# Patient Record
Sex: MALE | Race: WHITE
[De-identification: names, ages, dates, MRNs, and addresses within clinical notes are randomized per-mention and may not be internally consistent; named-entity substitution may affect disease eponyms.]

---

## 2021-04-28 PROBLEM — Z00.00 ENCOUNTER FOR PREVENTIVE HEALTH EXAMINATION: Status: ACTIVE | Noted: 2021-04-28

## 2021-04-29 ENCOUNTER — APPOINTMENT (OUTPATIENT)
Dept: ENDOCRINOLOGY | Facility: CLINIC | Age: 76
End: 2021-04-29
Payer: MEDICARE

## 2021-04-29 ENCOUNTER — RESULT CHARGE (OUTPATIENT)
Age: 76
End: 2021-04-29

## 2021-04-29 VITALS
SYSTOLIC BLOOD PRESSURE: 132 MMHG | WEIGHT: 146 LBS | BODY MASS INDEX: 24.32 KG/M2 | DIASTOLIC BLOOD PRESSURE: 80 MMHG | OXYGEN SATURATION: 99 % | HEIGHT: 65 IN | HEART RATE: 80 BPM

## 2021-04-29 LAB — GLUCOSE BLDC GLUCOMTR-MCNC: 134

## 2021-04-29 PROCEDURE — 82962 GLUCOSE BLOOD TEST: CPT

## 2021-04-29 PROCEDURE — 99072 ADDL SUPL MATRL&STAF TM PHE: CPT

## 2021-04-29 PROCEDURE — 99204 OFFICE O/P NEW MOD 45 MIN: CPT

## 2021-04-29 RX ORDER — LISINOPRIL 40 MG/1
40 TABLET ORAL
Refills: 0 | Status: ACTIVE | COMMUNITY

## 2021-04-29 RX ORDER — PRAVASTATIN SODIUM 80 MG/1
80 TABLET ORAL
Refills: 0 | Status: ACTIVE | COMMUNITY

## 2021-04-29 NOTE — ASSESSMENT
[FreeTextEntry1] : DM type 2, stable control\par prior h/o hypoparathyroidism; may need to improve, may need calcitriol. WIll search for recent bw

## 2021-04-29 NOTE — PHYSICAL EXAM
[Alert] : alert [No Acute Distress] : no acute distress [Normal Sclera/Conjunctiva] : normal sclera/conjunctiva [Thyroid Not Enlarged] : the thyroid was not enlarged [Clear to Auscultation] : lungs were clear to auscultation bilaterally [No Stigmata of Cushings Syndrome] : no stigmata of Cushings Syndrome [Cranial Nerves Intact] : cranial nerves 2-12 were intact [Oriented x3] : oriented to person, place, and time [Normal Insight/Judgement] : insight and judgment were intact [de-identified] : signs of overall weight loss [de-identified] : irregular rhythm [de-identified] : warm and dry

## 2021-04-29 NOTE — HISTORY OF PRESENT ILLNESS
[FreeTextEntry1] : DM type:2\par Severity:moderate\par Duration:33 years\par COmplications:nephropathy, neuropathy\par \par Modifying Factors:using rich 2\par \par Current regimen:\par glipizide 5\par metformin 500 2/day\par januvia 100 1/2 daily\par \par did not tolerate avandia and actos in the past due to dyspnea\par \par PMH:\par pacemaker\par idiopathic hypoparathyroidism, on calcitriol in the past\par s/p right knee replacement\par anemia, GI workup negative\par renal insufficiency, followed by Dr. Allen\par \par \par Current control:\par stable\par \par intermittent muscle spasms. Weight loss [Silvia] : Silvia [FreeTextEntry2] : 83 [FreeTextEntry3] : 15 [FreeTextEntry4] : 2

## 2021-07-28 ENCOUNTER — APPOINTMENT (OUTPATIENT)
Dept: ENDOCRINOLOGY | Facility: CLINIC | Age: 76
End: 2021-07-28
Payer: MEDICARE

## 2021-07-28 VITALS
HEART RATE: 74 BPM | HEIGHT: 64 IN | DIASTOLIC BLOOD PRESSURE: 80 MMHG | BODY MASS INDEX: 25.1 KG/M2 | WEIGHT: 147 LBS | SYSTOLIC BLOOD PRESSURE: 134 MMHG

## 2021-07-28 LAB — GLUCOSE BLDC GLUCOMTR-MCNC: 145

## 2021-07-28 PROCEDURE — 99214 OFFICE O/P EST MOD 30 MIN: CPT | Mod: 25

## 2021-07-28 PROCEDURE — 95251 CONT GLUC MNTR ANALYSIS I&R: CPT

## 2021-07-28 PROCEDURE — 99072 ADDL SUPL MATRL&STAF TM PHE: CPT

## 2021-07-28 PROCEDURE — 82962 GLUCOSE BLOOD TEST: CPT

## 2021-07-28 RX ORDER — GLIMEPIRIDE 4 MG/1
4 TABLET ORAL
Refills: 0 | Status: ACTIVE | COMMUNITY

## 2021-07-28 NOTE — HISTORY OF PRESENT ILLNESS
[Silvia] : Silvia [FreeTextEntry1] : DM type:2\par Severity:moderate\par Duration:33 years\par COmplications:nephropathy, neuropathy\par \par Modifying Factors:using rich 2\par \par Current regimen:\par glimepiride 4\par metformin 500 2/day\par januvia 100 1/2 daily\par \par did not tolerate avandia and actos in the past due to dyspnea\par \par PMH:\par pacemaker\par idiopathic hypoparathyroidism, on calcitriol in the past. \par s/p right knee replacement\par anemia, GI workup negative\par renal insufficiency, followed by Dr. Allen\par \par occasional cramps and paresthesias\par \par \par Current control:\par stable\par \par intermittent muscle spasms. Weight loss [FreeTextEntry2] : 86 [FreeTextEntry3] : 13 [FreeTextEntry4] : 1 [de-identified] : 6.6 [FreeTextEntry5] : 138 [FreeTextEntry6] : 29.5

## 2021-07-28 NOTE — ASSESSMENT
[FreeTextEntry1] : DM type 2, stable control\par hypocalcemia, worsening. Needs either more calcium or more calcitriol; will verify outpatient rx and then decide on change

## 2021-08-12 RX ORDER — BLOOD SUGAR DIAGNOSTIC
STRIP MISCELLANEOUS 3 TIMES DAILY
Qty: 300 | Refills: 0 | Status: ACTIVE | COMMUNITY
Start: 2021-08-12 | End: 1900-01-01

## 2021-10-20 LAB
HBA1C MFR BLD HPLC: 7.6
LDLC SERPL CALC-MCNC: 67

## 2021-10-21 ENCOUNTER — APPOINTMENT (OUTPATIENT)
Dept: ENDOCRINOLOGY | Facility: CLINIC | Age: 76
End: 2021-10-21

## 2021-11-01 LAB
HBA1C MFR BLD HPLC: 7.5
LDLC SERPL DIRECT ASSAY-MCNC: 75
MICROALBUMIN/CREAT 24H UR-RTO: 143

## 2021-11-03 ENCOUNTER — APPOINTMENT (OUTPATIENT)
Dept: ENDOCRINOLOGY | Facility: CLINIC | Age: 76
End: 2021-11-03
Payer: MEDICARE

## 2021-11-03 VITALS
BODY MASS INDEX: 25.1 KG/M2 | SYSTOLIC BLOOD PRESSURE: 130 MMHG | HEIGHT: 64 IN | DIASTOLIC BLOOD PRESSURE: 80 MMHG | HEART RATE: 66 BPM | WEIGHT: 147 LBS

## 2021-11-03 LAB
GLUCOSE BLDC GLUCOMTR-MCNC: 206
PODIATRY EVAL: NORMAL

## 2021-11-03 PROCEDURE — 99214 OFFICE O/P EST MOD 30 MIN: CPT | Mod: 25

## 2021-11-03 PROCEDURE — 95251 CONT GLUC MNTR ANALYSIS I&R: CPT

## 2021-11-03 PROCEDURE — 82962 GLUCOSE BLOOD TEST: CPT

## 2021-11-03 NOTE — HISTORY OF PRESENT ILLNESS
[Silvia] : Silvia [FreeTextEntry1] : DM type:2\par Severity:moderate\par Duration:33 years\par COmplications:nephropathy, neuropathy\par \par Modifying Factors:using rich 2\par \par Current regimen:\par glimepiride 4\par metformin 500 2/day\par januvia 100 1/2 daily\par \par did not tolerate avandia and actos in the past due to dyspnea\par \par PMH:\par pacemaker\par idiopathic hypoparathyroidism, on calcitriol in the past. \par s/p right knee replacement\par anemia, GI workup negative\par renal insufficiency, followed by Dr. Allen\par \par occasional cramps and paresthesias\par \par \par Current control:\par stable\par \par intermittent muscle spasms. Weight loss [FreeTextEntry2] : 83 [FreeTextEntry3] : 16 [FreeTextEntry4] : 1 [de-identified] : 6.7 [FreeTextEntry5] : 140 [FreeTextEntry6] : 27.7

## 2021-11-03 NOTE — ASSESSMENT
[FreeTextEntry1] : DM type 2, stable control. NOt a candidate for tighter control due to risk of hypoglycemia\par hypocalcemia, acceptable control

## 2022-03-23 ENCOUNTER — APPOINTMENT (OUTPATIENT)
Dept: ENDOCRINOLOGY | Facility: CLINIC | Age: 77
End: 2022-03-23
Payer: MEDICARE

## 2022-03-23 VITALS
BODY MASS INDEX: 25.1 KG/M2 | SYSTOLIC BLOOD PRESSURE: 130 MMHG | HEART RATE: 80 BPM | WEIGHT: 147 LBS | DIASTOLIC BLOOD PRESSURE: 70 MMHG | HEIGHT: 64 IN

## 2022-03-23 DIAGNOSIS — E78.00 PURE HYPERCHOLESTEROLEMIA, UNSPECIFIED: ICD-10-CM

## 2022-03-23 LAB — GLUCOSE BLDC GLUCOMTR-MCNC: 124

## 2022-03-23 PROCEDURE — 82962 GLUCOSE BLOOD TEST: CPT

## 2022-03-23 PROCEDURE — 99214 OFFICE O/P EST MOD 30 MIN: CPT | Mod: 25

## 2022-03-23 NOTE — PHYSICAL EXAM
[Thyroid Not Enlarged] : the thyroid was not enlarged [Clear to Auscultation] : lungs were clear to auscultation bilaterally [de-identified] : irregular rhythm

## 2022-03-23 NOTE — HISTORY OF PRESENT ILLNESS
[Silvia] : Silvia [FreeTextEntry1] : DM type:2\par Severity:moderate\par Duration:34 years\par COmplications:nephropathy, neuropathy\par \par Modifying Factors:using rich 2\par \par Current regimen:\par glimepiride 4\par metformin 500 2/day\par januvia 100 1/2 daily\par \par did not tolerate avandia and actos in the past due to dyspnea\par \par PMH:\par pacemaker\par idiopathic hypoparathyroidism, on calcitriol in the past. \par s/p right knee replacement\par anemia, GI workup negative\par renal insufficiency, followed by Dr. Allen\par \par occasional cramps and paresthesias\par \par \par Current control:\par stable\par \par intermittent muscle spasms. Weight loss [FreeTextEntry2] : 83 [FreeTextEntry3] : 16 [FreeTextEntry4] : 1 [de-identified] : 6.7 [FreeTextEntry5] : 140 [FreeTextEntry6] : 27.7

## 2022-03-23 NOTE — ASSESSMENT
[FreeTextEntry1] : DM type 2, stable control. NOt a candidate for tighter control due to risk of hypoglycemia\par hypocalcemia, acceptable control\par hyperlipidemia

## 2022-08-05 LAB
HBA1C MFR BLD HPLC: 7.7
LDLC SERPL DIRECT ASSAY-MCNC: 80
MICROALBUMIN/CREAT 24H UR-RTO: 38

## 2022-08-08 ENCOUNTER — APPOINTMENT (OUTPATIENT)
Dept: ENDOCRINOLOGY | Facility: CLINIC | Age: 77
End: 2022-08-08

## 2022-08-08 VITALS
OXYGEN SATURATION: 97 % | WEIGHT: 147 LBS | SYSTOLIC BLOOD PRESSURE: 130 MMHG | BODY MASS INDEX: 25.1 KG/M2 | HEIGHT: 64 IN | HEART RATE: 62 BPM | DIASTOLIC BLOOD PRESSURE: 70 MMHG

## 2022-08-08 LAB — GLUCOSE BLDC GLUCOMTR-MCNC: 162

## 2022-08-08 PROCEDURE — 95251 CONT GLUC MNTR ANALYSIS I&R: CPT

## 2022-08-08 PROCEDURE — 99214 OFFICE O/P EST MOD 30 MIN: CPT | Mod: 25

## 2022-08-08 NOTE — HISTORY OF PRESENT ILLNESS
[Silvia] : Silvia [FreeTextEntry1] : DM type:2\par Severity:moderate\par Duration:34 years\par COmplications:nephropathy, neuropathy\par \par Modifying Factors:using rich 2\par \par Current regimen:\par glimepiride 4\par metformin 500 2/day\par januvia 100 1/2 daily\par \par did not tolerate avandia and actos in the past due to dyspnea\par \par PMH:\par pacemaker\par idiopathic hypoparathyroidism, on calcitriol in the past. \par s/p right knee replacement\par anemia, GI workup negative\par renal insufficiency, followed by Dr. Allen\par \par occasional cramps and paresthesias\par \par \par Current control:\par stable\par \par intermittent muscle spasms. Weight loss [FreeTextEntry2] : 74 [FreeTextEntry3] : 25 [FreeTextEntry4] : 1 [de-identified] : 6.9 [FreeTextEntry5] : 150 [FreeTextEntry6] : 29.8

## 2022-08-08 NOTE — PHYSICAL EXAM
[Thyroid Not Enlarged] : the thyroid was not enlarged [Clear to Auscultation] : lungs were clear to auscultation bilaterally [Normal Rate] : heart rate was normal Albendazole Pregnancy And Lactation Text: This medication is Pregnancy Category C and it isn't known if it is safe during pregnancy. It is also excreted in breast milk.

## 2023-01-10 ENCOUNTER — OFFICE (OUTPATIENT)
Dept: URBAN - METROPOLITAN AREA CLINIC 12 | Facility: CLINIC | Age: 78
Setting detail: OPHTHALMOLOGY
End: 2023-01-10
Payer: COMMERCIAL

## 2023-01-10 DIAGNOSIS — D31.32: ICD-10-CM

## 2023-01-10 DIAGNOSIS — H04.123: ICD-10-CM

## 2023-01-10 DIAGNOSIS — E11.3293: ICD-10-CM

## 2023-01-10 DIAGNOSIS — H26.493: ICD-10-CM

## 2023-01-10 DIAGNOSIS — H43.393: ICD-10-CM

## 2023-01-10 PROCEDURE — 92134 CPTRZ OPH DX IMG PST SGM RTA: CPT | Performed by: OPHTHALMOLOGY

## 2023-01-10 PROCEDURE — 99214 OFFICE O/P EST MOD 30 MIN: CPT | Performed by: OPHTHALMOLOGY

## 2023-01-10 ASSESSMENT — SUPERFICIAL PUNCTATE KERATITIS (SPK)
OS_SPK: T
OD_SPK: T

## 2023-01-10 ASSESSMENT — REFRACTION_AUTOREFRACTION
OS_AXIS: 059
OD_SPHERE: +0.25
OD_CYLINDER: -1.25
OS_SPHERE: +0.25
OS_CYLINDER: -1.50
OD_AXIS: 083

## 2023-01-10 ASSESSMENT — REFRACTION_CURRENTRX
OS_AXIS: 063
OD_CYLINDER: -0.50
OD_OVR_VA: 20/
OS_SPHERE: +2.00
OD_VPRISM_DIRECTION: SV
OS_OVR_VA: 20/
OS_CYLINDER: -0.50
OD_AXIS: 054
OD_SPHERE: +1.75
OS_VPRISM_DIRECTION: SV

## 2023-01-10 ASSESSMENT — AXIALLENGTH_DERIVED
OS_AL: 24.216
OS_AL: 24.216
OD_AL: 24.2615
OD_AL: 24.2615

## 2023-01-10 ASSESSMENT — CONFRONTATIONAL VISUAL FIELD TEST (CVF)
OS_FINDINGS: FULL
OD_FINDINGS: FULL

## 2023-01-10 ASSESSMENT — REFRACTION_MANIFEST
OS_SPHERE: +0.25
OD_CYLINDER: -1.25
OS_CYLINDER: -1.50
OD_AXIS: 085
OS_VA1: 20/25-2
OD_SPHERE: +0.25
OS_AXIS: 060

## 2023-01-10 ASSESSMENT — VISUAL ACUITY
OS_BCVA: 20/25-1
OD_BCVA: 20/40+2

## 2023-01-10 ASSESSMENT — SPHEQUIV_DERIVED
OS_SPHEQUIV: -0.5
OD_SPHEQUIV: -0.375
OD_SPHEQUIV: -0.375
OS_SPHEQUIV: -0.5

## 2023-01-10 ASSESSMENT — TONOMETRY
OD_IOP_MMHG: 15
OS_IOP_MMHG: 13

## 2023-01-10 ASSESSMENT — KERATOMETRY
OD_K1POWER_DIOPTERS: 41.75
OS_K1POWER_DIOPTERS: 42.25
OS_AXISANGLE_DEGREES: 142
OD_K2POWER_DIOPTERS: 42.50
OD_AXISANGLE_DEGREES: 153
OS_K2POWER_DIOPTERS: 42.50

## 2023-01-23 ENCOUNTER — APPOINTMENT (OUTPATIENT)
Dept: ENDOCRINOLOGY | Facility: CLINIC | Age: 78
End: 2023-01-23
Payer: MEDICARE

## 2023-01-23 VITALS
BODY MASS INDEX: 25.1 KG/M2 | DIASTOLIC BLOOD PRESSURE: 66 MMHG | HEIGHT: 64 IN | OXYGEN SATURATION: 99 % | WEIGHT: 147 LBS | HEART RATE: 64 BPM | SYSTOLIC BLOOD PRESSURE: 120 MMHG

## 2023-01-23 LAB — GLUCOSE BLDC GLUCOMTR-MCNC: 161

## 2023-01-23 PROCEDURE — 99214 OFFICE O/P EST MOD 30 MIN: CPT | Mod: 25

## 2023-01-23 PROCEDURE — 95251 CONT GLUC MNTR ANALYSIS I&R: CPT

## 2023-01-23 RX ORDER — OMEPRAZOLE 20 MG/1
20 TABLET, DELAYED RELEASE ORAL
Refills: 0 | Status: DISCONTINUED | COMMUNITY
End: 2023-01-23

## 2023-01-23 RX ORDER — LORAZEPAM 0.5 MG/1
0.5 TABLET ORAL
Refills: 0 | Status: ACTIVE | COMMUNITY

## 2023-01-23 RX ORDER — PANTOPRAZOLE 40 MG/1
40 TABLET, DELAYED RELEASE ORAL
Refills: 0 | Status: ACTIVE | COMMUNITY

## 2023-01-23 RX ORDER — SITAGLIPTIN 100 MG/1
100 TABLET, FILM COATED ORAL
Refills: 0 | Status: DISCONTINUED | COMMUNITY
End: 2023-01-23

## 2023-01-23 RX ORDER — BUPROPION HYDROCHLORIDE 75 MG/1
75 TABLET, FILM COATED ORAL
Refills: 0 | Status: ACTIVE | COMMUNITY

## 2023-01-23 NOTE — HISTORY OF PRESENT ILLNESS
[Silvia] : Silvia [FreeTextEntry1] : DM type:2\par Severity:moderate\par Duration:35 years\par COmplications:nephropathy, neuropathy\par \par Modifying Factors:using rich 2\par \par Current regimen:\par glimepiride 4\par metformin 500 2/day\par januvia 100 1/2 daily\par \par did not tolerate avandia and actos in the past due to dyspnea\par \par PMH:\par pacemaker\par idiopathic hypoparathyroidism, on calcitriol in the past. \par s/p right knee replacement\par anemia, GI workup negative\par renal insufficiency, followed by Dr. Allen\par \par occasional cramps and paresthesias\par \par \par Current control:\par stable\par \par intermittent muscle spasms. Weight loss [FreeTextEntry2] : 72 [FreeTextEntry3] : 27 [FreeTextEntry4] : 1 [de-identified] : 7 [FreeTextEntry5] : 153 [FreeTextEntry6] : 29.5

## 2023-01-23 NOTE — ASSESSMENT
[FreeTextEntry1] : DM type 2, stable control. NOt a candidate for tighter control due to risk of hypoglycemia\par hypocalcemia, acceptable control\par hyperlipidemia on therapy

## 2023-02-15 ENCOUNTER — OFFICE (OUTPATIENT)
Dept: URBAN - METROPOLITAN AREA CLINIC 12 | Facility: CLINIC | Age: 78
Setting detail: OPHTHALMOLOGY
End: 2023-02-15
Payer: COMMERCIAL

## 2023-02-15 DIAGNOSIS — Z20.822: ICD-10-CM

## 2023-02-15 DIAGNOSIS — Z01.812: ICD-10-CM

## 2023-02-15 PROCEDURE — 99211 OFF/OP EST MAY X REQ PHY/QHP: CPT | Performed by: OPHTHALMOLOGY

## 2023-02-15 ASSESSMENT — REFRACTION_AUTOREFRACTION
OS_AXIS: 059
OD_SPHERE: +0.25
OS_SPHERE: +0.25
OD_AXIS: 083
OD_CYLINDER: -1.25
OS_CYLINDER: -1.50

## 2023-02-15 ASSESSMENT — KERATOMETRY
OS_AXISANGLE_DEGREES: 142
OD_K1POWER_DIOPTERS: 41.75
OD_AXISANGLE_DEGREES: 153
OS_K2POWER_DIOPTERS: 42.50
OD_K2POWER_DIOPTERS: 42.50
OS_K1POWER_DIOPTERS: 42.25

## 2023-02-15 ASSESSMENT — AXIALLENGTH_DERIVED
OS_AL: 24.216
OD_AL: 24.2615
OD_AL: 24.2615
OS_AL: 24.216

## 2023-02-15 ASSESSMENT — REFRACTION_MANIFEST
OS_SPHERE: +0.25
OS_AXIS: 060
OD_AXIS: 085
OD_CYLINDER: -1.25
OD_SPHERE: +0.25
OS_VA1: 20/25-2
OS_CYLINDER: -1.50

## 2023-02-15 ASSESSMENT — SPHEQUIV_DERIVED
OD_SPHEQUIV: -0.375
OD_SPHEQUIV: -0.375
OS_SPHEQUIV: -0.5
OS_SPHEQUIV: -0.5

## 2023-02-15 ASSESSMENT — REFRACTION_CURRENTRX
OS_OVR_VA: 20/
OD_CYLINDER: -0.50
OD_VPRISM_DIRECTION: SV
OS_CYLINDER: -0.50
OD_OVR_VA: 20/
OD_AXIS: 054
OS_VPRISM_DIRECTION: SV
OS_AXIS: 063
OD_SPHERE: +1.75
OS_SPHERE: +2.00

## 2023-02-15 ASSESSMENT — VISUAL ACUITY
OD_BCVA: 20/40+2
OS_BCVA: 20/25-1

## 2023-02-20 ENCOUNTER — ASC (OUTPATIENT)
Dept: URBAN - METROPOLITAN AREA SURGERY 8 | Facility: SURGERY | Age: 78
Setting detail: OPHTHALMOLOGY
End: 2023-02-20
Payer: COMMERCIAL

## 2023-02-20 ENCOUNTER — RX ONLY (RX ONLY)
Age: 78
End: 2023-02-20

## 2023-02-20 DIAGNOSIS — H26.491: ICD-10-CM

## 2023-02-20 PROCEDURE — 66821 AFTER CATARACT LASER SURGERY: CPT | Performed by: OPHTHALMOLOGY

## 2023-02-20 ASSESSMENT — REFRACTION_MANIFEST
OS_CYLINDER: -1.50
OS_SPHERE: +0.25
OS_VA1: 20/25-2
OD_CYLINDER: -1.25
OD_SPHERE: +0.25
OS_AXIS: 060
OD_AXIS: 085

## 2023-02-20 ASSESSMENT — SPHEQUIV_DERIVED
OD_SPHEQUIV: -0.375
OD_SPHEQUIV: -0.375
OS_SPHEQUIV: -0.5
OS_SPHEQUIV: -0.5

## 2023-02-20 ASSESSMENT — KERATOMETRY
OD_K2POWER_DIOPTERS: 42.50
OD_K1POWER_DIOPTERS: 41.75
OS_K2POWER_DIOPTERS: 42.50
OD_AXISANGLE_DEGREES: 153
OS_AXISANGLE_DEGREES: 142
OS_K1POWER_DIOPTERS: 42.25

## 2023-02-20 ASSESSMENT — REFRACTION_AUTOREFRACTION
OS_SPHERE: +0.25
OS_AXIS: 059
OD_AXIS: 083
OD_SPHERE: +0.25
OS_CYLINDER: -1.50
OD_CYLINDER: -1.25

## 2023-02-20 ASSESSMENT — REFRACTION_CURRENTRX
OS_VPRISM_DIRECTION: SV
OD_AXIS: 054
OD_VPRISM_DIRECTION: SV
OS_AXIS: 063
OS_SPHERE: +2.00
OD_OVR_VA: 20/
OD_SPHERE: +1.75
OS_OVR_VA: 20/
OD_CYLINDER: -0.50
OS_CYLINDER: -0.50

## 2023-02-20 ASSESSMENT — AXIALLENGTH_DERIVED
OS_AL: 24.216
OS_AL: 24.216
OD_AL: 24.2615
OD_AL: 24.2615

## 2023-02-20 ASSESSMENT — SUPERFICIAL PUNCTATE KERATITIS (SPK)
OS_SPK: T
OD_SPK: T

## 2023-02-20 ASSESSMENT — VISUAL ACUITY
OS_BCVA: 20/25-1
OD_BCVA: 20/40+2

## 2023-03-20 ENCOUNTER — ASC (OUTPATIENT)
Dept: URBAN - METROPOLITAN AREA SURGERY 8 | Facility: SURGERY | Age: 78
Setting detail: OPHTHALMOLOGY
End: 2023-03-20
Payer: COMMERCIAL

## 2023-03-20 ENCOUNTER — RX ONLY (RX ONLY)
Age: 78
End: 2023-03-20

## 2023-03-20 DIAGNOSIS — H26.492: ICD-10-CM

## 2023-03-20 PROCEDURE — 66821 AFTER CATARACT LASER SURGERY: CPT | Performed by: OPHTHALMOLOGY

## 2023-03-20 ASSESSMENT — KERATOMETRY
OS_AXISANGLE_DEGREES: 142
OD_AXISANGLE_DEGREES: 153
OD_K1POWER_DIOPTERS: 41.75
OS_K1POWER_DIOPTERS: 42.25
OS_K2POWER_DIOPTERS: 42.50
OD_K2POWER_DIOPTERS: 42.50

## 2023-03-20 ASSESSMENT — REFRACTION_MANIFEST
OS_CYLINDER: -1.50
OS_SPHERE: +0.25
OD_CYLINDER: -1.25
OS_VA1: 20/25-2
OD_AXIS: 085
OD_SPHERE: +0.25
OS_AXIS: 060

## 2023-03-20 ASSESSMENT — AXIALLENGTH_DERIVED
OS_AL: 24.216
OD_AL: 24.2615
OS_AL: 24.216
OD_AL: 24.2615

## 2023-03-20 ASSESSMENT — REFRACTION_CURRENTRX
OS_OVR_VA: 20/
OD_OVR_VA: 20/
OD_AXIS: 054
OS_CYLINDER: -0.50
OS_VPRISM_DIRECTION: SV
OS_SPHERE: +2.00
OD_SPHERE: +1.75
OD_VPRISM_DIRECTION: SV
OD_CYLINDER: -0.50
OS_AXIS: 063

## 2023-03-20 ASSESSMENT — VISUAL ACUITY
OD_BCVA: 20/40+2
OS_BCVA: 20/25-1

## 2023-03-20 ASSESSMENT — SPHEQUIV_DERIVED
OS_SPHEQUIV: -0.5
OD_SPHEQUIV: -0.375
OD_SPHEQUIV: -0.375
OS_SPHEQUIV: -0.5

## 2023-03-20 ASSESSMENT — REFRACTION_AUTOREFRACTION
OD_CYLINDER: -1.25
OS_CYLINDER: -1.50
OS_AXIS: 059
OS_SPHERE: +0.25
OD_AXIS: 083
OD_SPHERE: +0.25

## 2023-03-20 ASSESSMENT — SUPERFICIAL PUNCTATE KERATITIS (SPK)
OS_SPK: T
OD_SPK: T

## 2023-04-03 ENCOUNTER — OFFICE (OUTPATIENT)
Dept: URBAN - METROPOLITAN AREA CLINIC 12 | Facility: CLINIC | Age: 78
Setting detail: OPHTHALMOLOGY
End: 2023-04-03
Payer: COMMERCIAL

## 2023-04-03 DIAGNOSIS — H26.493: ICD-10-CM

## 2023-04-03 PROCEDURE — 99024 POSTOP FOLLOW-UP VISIT: CPT | Performed by: OPTOMETRIST

## 2023-04-03 ASSESSMENT — AXIALLENGTH_DERIVED
OS_AL: 24.3132
OS_AL: 24.2615
OD_AL: 24.2131

## 2023-04-03 ASSESSMENT — REFRACTION_CURRENTRX
OD_OVR_VA: 20/
OD_CYLINDER: -0.50
OS_CYLINDER: -0.50
OD_AXIS: 054
OS_OVR_VA: 20/
OS_VPRISM_DIRECTION: SV
OD_VPRISM_DIRECTION: SV
OS_SPHERE: +2.00
OD_SPHERE: +1.75
OS_AXIS: 063

## 2023-04-03 ASSESSMENT — REFRACTION_MANIFEST
OS_VA1: 20/25-2
OS_AXIS: 060
OS_SPHERE: +0.25
OD_CYLINDER: -1.25
OD_AXIS: 085
OS_CYLINDER: -1.50
OD_SPHERE: +0.25

## 2023-04-03 ASSESSMENT — KERATOMETRY
OD_K1POWER_DIOPTERS: 41.50
OD_K2POWER_DIOPTERS: 43.00
OS_K2POWER_DIOPTERS: 42.50
OS_K1POWER_DIOPTERS: 41.75
OD_AXISANGLE_DEGREES: 165
OS_AXISANGLE_DEGREES: 153

## 2023-04-03 ASSESSMENT — REFRACTION_AUTOREFRACTION
OS_AXIS: 65
OD_SPHERE: PLANO
OD_AXIS: 82
OS_SPHERE: +0.50
OD_CYLINDER: -1.00
OS_CYLINDER: -1.75

## 2023-04-03 ASSESSMENT — SPHEQUIV_DERIVED
OS_SPHEQUIV: -0.5
OD_SPHEQUIV: -0.375
OS_SPHEQUIV: -0.375

## 2023-04-03 ASSESSMENT — VISUAL ACUITY
OS_BCVA: 20/30-1
OD_BCVA: 20/25

## 2023-04-03 ASSESSMENT — TONOMETRY
OD_IOP_MMHG: 13
OS_IOP_MMHG: 12

## 2023-04-03 ASSESSMENT — SUPERFICIAL PUNCTATE KERATITIS (SPK)
OS_SPK: T
OD_SPK: T

## 2023-04-03 ASSESSMENT — CONFRONTATIONAL VISUAL FIELD TEST (CVF)
OS_FINDINGS: FULL
OD_FINDINGS: FULL

## 2023-08-03 ENCOUNTER — APPOINTMENT (OUTPATIENT)
Dept: ENDOCRINOLOGY | Facility: CLINIC | Age: 78
End: 2023-08-03
Payer: MEDICARE

## 2023-08-03 VITALS
DIASTOLIC BLOOD PRESSURE: 72 MMHG | WEIGHT: 140 LBS | OXYGEN SATURATION: 98 % | SYSTOLIC BLOOD PRESSURE: 120 MMHG | HEIGHT: 64 IN | HEART RATE: 66 BPM | BODY MASS INDEX: 23.9 KG/M2

## 2023-08-03 LAB — GLUCOSE BLDC GLUCOMTR-MCNC: 256

## 2023-08-03 PROCEDURE — 99214 OFFICE O/P EST MOD 30 MIN: CPT | Mod: 25

## 2023-08-03 PROCEDURE — 82962 GLUCOSE BLOOD TEST: CPT

## 2023-08-03 PROCEDURE — 95251 CONT GLUC MNTR ANALYSIS I&R: CPT

## 2023-08-03 NOTE — ASSESSMENT
[FreeTextEntry1] : DM type 2, mild loss of control. Try to take the full dose of januvia. hypocalcemia, acceptable control hyperlipidemia on therapy

## 2023-08-03 NOTE — HISTORY OF PRESENT ILLNESS
[Silvia] : Silvia [FreeTextEntry1] : DM type:2 Severity:moderate Duration:35 years COmplications:nephropathy, neuropathy  Modifying Factors:using rich 2  Current regimen: glimepiride 4 metformin 500 2/day unable to tolerate higher amounts januvia 100 1/2 daily  did not tolerate avandia and actos in the past due to dyspnea  PMH: pacemaker idiopathic hypoparathyroidism, on calcitriol in the past.  s/p right knee replacement anemia, GI workup negative renal insufficiency, followed by Dr. Allen  occasional cramps and paresthesias   Current control: stable  intermittent muscle spasms. Weight loss [FreeTextEntry2] : 56 [FreeTextEntry3] : 44 [FreeTextEntry4] : 0 [de-identified] : 0 [FreeTextEntry5] : 183 [FreeTextEntry6] : 29.1

## 2023-09-20 ENCOUNTER — OFFICE (OUTPATIENT)
Dept: URBAN - METROPOLITAN AREA CLINIC 12 | Facility: CLINIC | Age: 78
Setting detail: OPHTHALMOLOGY
End: 2023-09-20
Payer: COMMERCIAL

## 2023-09-20 DIAGNOSIS — H26.493: ICD-10-CM

## 2023-09-20 DIAGNOSIS — H04.123: ICD-10-CM

## 2023-09-20 DIAGNOSIS — H43.393: ICD-10-CM

## 2023-09-20 DIAGNOSIS — E11.3293: ICD-10-CM

## 2023-09-20 PROBLEM — D31.33 CHOROIDAL NEVUS ; BOTH EYES: Status: ACTIVE | Noted: 2023-09-20

## 2023-09-20 PROCEDURE — 92014 COMPRE OPH EXAM EST PT 1/>: CPT | Performed by: OPHTHALMOLOGY

## 2023-09-20 PROCEDURE — 92250 FUNDUS PHOTOGRAPHY W/I&R: CPT | Performed by: OPHTHALMOLOGY

## 2023-09-20 ASSESSMENT — REFRACTION_AUTOREFRACTION
OD_CYLINDER: -1.50
OD_SPHERE: +0.50
OS_SPHERE: +0.50
OS_CYLINDER: -1.50
OD_AXIS: 086
OS_AXIS: 061

## 2023-09-20 ASSESSMENT — REFRACTION_MANIFEST
OD_CYLINDER: -1.25
OD_AXIS: 085
OS_CYLINDER: -1.50
OS_AXIS: 060
OS_SPHERE: +0.25
OD_SPHERE: +0.25
OS_VA1: 20/25-2

## 2023-09-20 ASSESSMENT — SUPERFICIAL PUNCTATE KERATITIS (SPK)
OS_SPK: 1+ 2+
OD_SPK: 1+ 2+

## 2023-09-20 ASSESSMENT — REFRACTION_CURRENTRX
OS_AXIS: 063
OS_OVR_VA: 20/
OD_VPRISM_DIRECTION: SV
OS_CYLINDER: -0.50
OS_VPRISM_DIRECTION: SV
OD_AXIS: 054
OS_SPHERE: +2.00
OD_CYLINDER: -0.50
OD_OVR_VA: 20/
OD_SPHERE: +1.75

## 2023-09-20 ASSESSMENT — CONFRONTATIONAL VISUAL FIELD TEST (CVF)
OD_FINDINGS: FULL
OS_FINDINGS: FULL

## 2023-09-20 ASSESSMENT — VISUAL ACUITY
OD_BCVA: 20/30-2
OS_BCVA: 20/40+1

## 2023-09-20 ASSESSMENT — KERATOMETRY
OS_K1POWER_DIOPTERS: 42.25
OD_K1POWER_DIOPTERS: 41.50
OD_K2POWER_DIOPTERS: 42.50
OD_AXISANGLE_DEGREES: 167
OS_AXISANGLE_DEGREES: 160
OS_K2POWER_DIOPTERS: 42.50

## 2023-09-20 ASSESSMENT — SPHEQUIV_DERIVED
OD_SPHEQUIV: -0.25
OS_SPHEQUIV: -0.25
OD_SPHEQUIV: -0.375
OS_SPHEQUIV: -0.5

## 2023-09-20 ASSESSMENT — AXIALLENGTH_DERIVED
OS_AL: 24.1138
OD_AL: 24.3102
OS_AL: 24.216
OD_AL: 24.2586

## 2023-09-20 ASSESSMENT — TONOMETRY
OD_IOP_MMHG: 12
OS_IOP_MMHG: 12

## 2024-01-07 ENCOUNTER — RX RENEWAL (OUTPATIENT)
Age: 79
End: 2024-01-07

## 2024-01-07 RX ORDER — METFORMIN HYDROCHLORIDE 500 MG/1
500 TABLET, COATED ORAL
Qty: 180 | Refills: 3 | Status: ACTIVE | COMMUNITY
Start: 1900-01-01 | End: 1900-01-01

## 2024-02-02 LAB
HBA1C MFR BLD HPLC: 7.9
LDLC SERPL CALC-MCNC: 86
MICROALBUMIN/CREAT 24H UR-RTO: 27

## 2024-02-07 ENCOUNTER — APPOINTMENT (OUTPATIENT)
Dept: ENDOCRINOLOGY | Facility: CLINIC | Age: 79
End: 2024-02-07
Payer: MEDICARE

## 2024-02-07 DIAGNOSIS — E20.9 HYPOPARATHYROIDISM, UNSPECIFIED: ICD-10-CM

## 2024-02-07 DIAGNOSIS — E11.22 TYPE 2 DIABETES MELLITUS WITH DIABETIC CHRONIC KIDNEY DISEASE: ICD-10-CM

## 2024-02-07 PROCEDURE — G2211 COMPLEX E/M VISIT ADD ON: CPT

## 2024-02-07 PROCEDURE — 95251 CONT GLUC MNTR ANALYSIS I&R: CPT

## 2024-02-07 PROCEDURE — 99214 OFFICE O/P EST MOD 30 MIN: CPT

## 2024-02-07 RX ORDER — WARFARIN 2 MG/1
2 TABLET ORAL
Refills: 0 | Status: DISCONTINUED | COMMUNITY
End: 2024-02-07

## 2024-02-07 RX ORDER — APIXABAN 5 MG/1
TABLET, FILM COATED ORAL
Refills: 0 | Status: ACTIVE | COMMUNITY

## 2024-02-07 NOTE — ASSESSMENT
[FreeTextEntry1] : DM type 2, good control based n rich download; A1C may be falsely high hypocalcemia, acceptable control hyperlipidemia on therapy

## 2024-02-07 NOTE — PHYSICAL EXAM
[Thyroid Not Enlarged] : the thyroid was not enlarged [Normal Rate] : heart rate was normal Offered, attempted but was not successful

## 2024-02-07 NOTE — HISTORY OF PRESENT ILLNESS
[Silvia] : Silvia [FreeTextEntry1] : DM type:2 Severity:moderate Duration:36 years COmplications:nephropathy, neuropathy  Modifying Factors:using rich 2  Current regimen: glimepiride 4 metformin 500 2/day unable to tolerate higher amounts januvia 100 1/2 daily  did not tolerate avandia and actos in the past due to dyspnea  PMH: pacemaker idiopathic hypoparathyroidism, on calcitriol in the past.  s/p right knee replacement anemia, GI workup negative renal insufficiency, followed by Dr. Allen  occasional cramps and paresthesias   Current control: stable  intermittent muscle spasms. Weight loss [FreeTextEntry2] : 75 [FreeTextEntry3] : 25 [FreeTextEntry4] : 0 [de-identified] : 7 [FreeTextEntry5] : 156 [FreeTextEntry6] : 27.9

## 2024-03-19 ENCOUNTER — OFFICE (OUTPATIENT)
Dept: URBAN - METROPOLITAN AREA CLINIC 12 | Facility: CLINIC | Age: 79
Setting detail: OPHTHALMOLOGY
End: 2024-03-19
Payer: COMMERCIAL

## 2024-03-19 DIAGNOSIS — H43.393: ICD-10-CM

## 2024-03-19 DIAGNOSIS — H04.123: ICD-10-CM

## 2024-03-19 DIAGNOSIS — D31.30: ICD-10-CM

## 2024-03-19 DIAGNOSIS — E11.3293: ICD-10-CM

## 2024-03-19 DIAGNOSIS — Z96.1: ICD-10-CM

## 2024-03-19 PROCEDURE — 92014 COMPRE OPH EXAM EST PT 1/>: CPT | Performed by: OPHTHALMOLOGY

## 2024-03-19 PROCEDURE — 92134 CPTRZ OPH DX IMG PST SGM RTA: CPT | Performed by: OPHTHALMOLOGY

## 2024-03-19 ASSESSMENT — REFRACTION_MANIFEST
OS_CYLINDER: -1.50
OS_VA1: 20/25-2
OS_SPHERE: +0.25
OS_AXIS: 060
OD_SPHERE: +0.25
OD_CYLINDER: -1.25
OD_AXIS: 085

## 2024-03-19 ASSESSMENT — REFRACTION_CURRENTRX
OS_OVR_VA: 20/
OD_AXIS: 054
OS_CYLINDER: -0.50
OD_OVR_VA: 20/
OS_SPHERE: +2.00
OS_VPRISM_DIRECTION: SV
OD_VPRISM_DIRECTION: SV
OD_SPHERE: +1.75
OS_AXIS: 063
OD_CYLINDER: -0.50

## 2024-03-19 ASSESSMENT — SPHEQUIV_DERIVED
OD_SPHEQUIV: -0.375
OS_SPHEQUIV: -0.5

## 2024-08-07 ENCOUNTER — APPOINTMENT (OUTPATIENT)
Dept: ENDOCRINOLOGY | Facility: CLINIC | Age: 79
End: 2024-08-07

## 2024-08-07 PROCEDURE — 99214 OFFICE O/P EST MOD 30 MIN: CPT

## 2024-08-07 PROCEDURE — 95251 CONT GLUC MNTR ANALYSIS I&R: CPT

## 2024-08-07 PROCEDURE — 82962 GLUCOSE BLOOD TEST: CPT

## 2024-08-07 PROCEDURE — G2211 COMPLEX E/M VISIT ADD ON: CPT

## 2024-08-07 NOTE — ASSESSMENT
[FreeTextEntry1] : DM type 2, fair control, but not a candidate for tight control due to frailty and risk of adverse effects from medications. GMI 7.9 based on last rich report hypocalcemia, acceptable control hyperlipidemia on therapy

## 2024-08-07 NOTE — HISTORY OF PRESENT ILLNESS
[Silvia] : Silvia [FreeTextEntry1] : DM type:2 Severity:moderate Duration:36 years COmplications:nephropathy, neuropathy  Modifying Factors:using rich 2  Current regimen: glimepiride 4 metformin 500 2/day bid januvia 100 1/2 daily  did not tolerate avandia and actos in the past due to dyspnea  PMH: pacemaker idiopathic hypoparathyroidism, on calcitriol in the past.  s/p right knee replacement anemia, GI workup negative renal insufficiency, followed by Dr. Allen  occasional cramps and paresthesias   Current control: stable  intermittent muscle spasms. Weight loss [FreeTextEntry2] : 48 [FreeTextEntry3] : 52 [FreeTextEntry4] : 0 [de-identified] : 7.9 [FreeTextEntry5] : 193 [FreeTextEntry6] : 30.7

## 2024-09-19 ENCOUNTER — OFFICE (OUTPATIENT)
Dept: URBAN - METROPOLITAN AREA CLINIC 12 | Facility: CLINIC | Age: 79
Setting detail: OPHTHALMOLOGY
End: 2024-09-19
Payer: COMMERCIAL

## 2024-09-19 DIAGNOSIS — H43.393: ICD-10-CM

## 2024-09-19 DIAGNOSIS — E11.3293: ICD-10-CM

## 2024-09-19 DIAGNOSIS — H04.123: ICD-10-CM

## 2024-09-19 DIAGNOSIS — D31.30: ICD-10-CM

## 2024-09-19 PROCEDURE — 92014 COMPRE OPH EXAM EST PT 1/>: CPT | Performed by: OPHTHALMOLOGY

## 2024-09-19 PROCEDURE — 92250 FUNDUS PHOTOGRAPHY W/I&R: CPT | Performed by: OPHTHALMOLOGY

## 2024-09-19 ASSESSMENT — CONFRONTATIONAL VISUAL FIELD TEST (CVF)
OS_FINDINGS: FULL
OD_FINDINGS: FULL

## 2024-10-02 ENCOUNTER — NON-APPOINTMENT (OUTPATIENT)
Age: 79
End: 2024-10-02

## 2024-10-02 DIAGNOSIS — N18.30 CHRONIC KIDNEY DISEASE, STAGE 3 UNSPECIFIED: ICD-10-CM

## 2024-10-02 DIAGNOSIS — I25.10 ATHEROSCLEROTIC HEART DISEASE OF NATIVE CORONARY ARTERY W/OUT ANGINA PECTORIS: ICD-10-CM

## 2024-10-02 DIAGNOSIS — Z78.9 OTHER SPECIFIED HEALTH STATUS: ICD-10-CM

## 2024-10-02 DIAGNOSIS — R80.9 PROTEINURIA, UNSPECIFIED: ICD-10-CM

## 2024-10-02 RX ORDER — SITAGLIPTIN 100 MG/1
100 TABLET, FILM COATED ORAL
Refills: 0 | Status: ACTIVE | COMMUNITY

## 2024-10-02 RX ORDER — SIMETHICONE 80 MG/1
80 TABLET, CHEWABLE ORAL
Refills: 0 | Status: ACTIVE | COMMUNITY

## 2024-10-02 RX ORDER — ASPIRIN 81 MG
81 TABLET, DELAYED RELEASE (ENTERIC COATED) ORAL
Refills: 0 | Status: ACTIVE | COMMUNITY

## 2025-01-07 ENCOUNTER — APPOINTMENT (OUTPATIENT)
Age: 80
End: 2025-01-07

## 2025-01-28 ENCOUNTER — APPOINTMENT (OUTPATIENT)
Age: 80
End: 2025-01-28
Payer: MEDICARE

## 2025-01-28 VITALS
HEART RATE: 80 BPM | DIASTOLIC BLOOD PRESSURE: 82 MMHG | SYSTOLIC BLOOD PRESSURE: 155 MMHG | HEIGHT: 64 IN | WEIGHT: 160 LBS | BODY MASS INDEX: 27.31 KG/M2

## 2025-01-28 DIAGNOSIS — N18.30 CHRONIC KIDNEY DISEASE, STAGE 3 UNSPECIFIED: ICD-10-CM

## 2025-01-28 DIAGNOSIS — E20.9 HYPOPARATHYROIDISM, UNSPECIFIED: ICD-10-CM

## 2025-01-28 DIAGNOSIS — E78.00 PURE HYPERCHOLESTEROLEMIA, UNSPECIFIED: ICD-10-CM

## 2025-01-28 DIAGNOSIS — R80.9 PROTEINURIA, UNSPECIFIED: ICD-10-CM

## 2025-01-28 DIAGNOSIS — E11.22 TYPE 2 DIABETES MELLITUS WITH DIABETIC CHRONIC KIDNEY DISEASE: ICD-10-CM

## 2025-01-28 PROCEDURE — 99213 OFFICE O/P EST LOW 20 MIN: CPT

## 2025-01-28 PROCEDURE — 99203 OFFICE O/P NEW LOW 30 MIN: CPT

## 2025-01-31 RX ORDER — BLOOD-GLUCOSE SENSOR
EACH MISCELLANEOUS
Qty: 6 | Refills: 0 | Status: ACTIVE | COMMUNITY
Start: 2024-09-27

## 2025-02-05 ENCOUNTER — RX RENEWAL (OUTPATIENT)
Age: 80
End: 2025-02-05

## 2025-03-21 ENCOUNTER — OFFICE (OUTPATIENT)
Dept: URBAN - METROPOLITAN AREA CLINIC 12 | Facility: CLINIC | Age: 80
Setting detail: OPHTHALMOLOGY
End: 2025-03-21
Payer: COMMERCIAL

## 2025-03-21 DIAGNOSIS — D31.30: ICD-10-CM

## 2025-03-21 DIAGNOSIS — H04.123: ICD-10-CM

## 2025-03-21 DIAGNOSIS — H43.393: ICD-10-CM

## 2025-03-21 DIAGNOSIS — E11.3293: ICD-10-CM

## 2025-03-21 PROCEDURE — 92014 COMPRE OPH EXAM EST PT 1/>: CPT | Performed by: OPHTHALMOLOGY

## 2025-03-21 PROCEDURE — 92134 CPTRZ OPH DX IMG PST SGM RTA: CPT | Performed by: OPHTHALMOLOGY

## 2025-03-21 ASSESSMENT — KERATOMETRY
OS_AXISANGLE_DEGREES: 148
OD_AXISANGLE_DEGREES: 125
OD_K2POWER_DIOPTERS: 42.75
OS_K1POWER_DIOPTERS: 42.00
OD_K1POWER_DIOPTERS: 42.25
OS_K2POWER_DIOPTERS: 43.00

## 2025-03-21 ASSESSMENT — REFRACTION_AUTOREFRACTION
OD_AXIS: 009
OS_SPHERE: +0.25
OD_CYLINDER: -0.25
OD_SPHERE: +0.75
OS_CYLINDER: -1.75
OS_AXIS: 050

## 2025-03-21 ASSESSMENT — REFRACTION_CURRENTRX
OS_VPRISM_DIRECTION: SV
OD_CYLINDER: -0.50
OS_SPHERE: +2.00
OD_OVR_VA: 20/
OS_AXIS: 063
OS_CYLINDER: -0.50
OS_OVR_VA: 20/
OD_SPHERE: +1.75
OD_VPRISM_DIRECTION: SV
OD_AXIS: 054

## 2025-03-21 ASSESSMENT — REFRACTION_MANIFEST
OD_SPHERE: +0.25
OS_VA1: 20/25-2
OS_AXIS: 060
OS_CYLINDER: -1.50
OD_AXIS: 085
OS_SPHERE: +0.25
OD_CYLINDER: -1.25

## 2025-03-21 ASSESSMENT — SUPERFICIAL PUNCTATE KERATITIS (SPK)
OS_SPK: 1+ 2+
OD_SPK: 1+ 2+

## 2025-03-21 ASSESSMENT — TONOMETRY
OS_IOP_MMHG: 12
OD_IOP_MMHG: 10

## 2025-03-21 ASSESSMENT — VISUAL ACUITY
OD_BCVA: 20/40+2
OS_BCVA: 20/30-2

## 2025-03-21 ASSESSMENT — CONFRONTATIONAL VISUAL FIELD TEST (CVF)
OD_FINDINGS: FULL
OS_FINDINGS: FULL

## 2025-04-04 LAB
LDLC SERPL DIRECT ASSAY-MCNC: 85
LDLC SERPL DIRECT ASSAY-MCNC: 90
TSH SERPL-ACNC: 1.05

## 2025-04-07 ENCOUNTER — NON-APPOINTMENT (OUTPATIENT)
Age: 80
End: 2025-04-07

## 2025-04-07 ENCOUNTER — APPOINTMENT (OUTPATIENT)
Dept: ENDOCRINOLOGY | Facility: CLINIC | Age: 80
End: 2025-04-07
Payer: MEDICARE

## 2025-04-07 VITALS
HEIGHT: 64 IN | OXYGEN SATURATION: 99 % | WEIGHT: 140 LBS | SYSTOLIC BLOOD PRESSURE: 127 MMHG | HEART RATE: 74 BPM | BODY MASS INDEX: 23.9 KG/M2 | DIASTOLIC BLOOD PRESSURE: 70 MMHG

## 2025-04-07 DIAGNOSIS — E11.22 TYPE 2 DIABETES MELLITUS WITH DIABETIC CHRONIC KIDNEY DISEASE: ICD-10-CM

## 2025-04-07 DIAGNOSIS — E20.9 HYPOPARATHYROIDISM, UNSPECIFIED: ICD-10-CM

## 2025-04-07 LAB — GLUCOSE BLDC GLUCOMTR-MCNC: 228

## 2025-04-07 PROCEDURE — 82962 GLUCOSE BLOOD TEST: CPT

## 2025-04-07 PROCEDURE — 95251 CONT GLUC MNTR ANALYSIS I&R: CPT

## 2025-04-07 PROCEDURE — 99214 OFFICE O/P EST MOD 30 MIN: CPT

## 2025-04-07 PROCEDURE — G2211 COMPLEX E/M VISIT ADD ON: CPT

## 2025-08-27 ENCOUNTER — APPOINTMENT (OUTPATIENT)
Age: 80
End: 2025-08-27

## 2025-09-10 ENCOUNTER — RX RENEWAL (OUTPATIENT)
Age: 80
End: 2025-09-10